# Patient Record
Sex: MALE | URBAN - METROPOLITAN AREA
[De-identification: names, ages, dates, MRNs, and addresses within clinical notes are randomized per-mention and may not be internally consistent; named-entity substitution may affect disease eponyms.]

---

## 2019-09-13 NOTE — PATIENT DISCUSSION
POAG, OU: INTRAOCULAR PRESSURE IS WITHIN ACCEPTABLE LIMITS. PT INSTRUCTED TO CONTINUE ALPHAGAN BID OU AND LUMIGAN QHS OU. REFER TO DR. WONG FOR GLAUCOMA EVALUATION AND MANAGEMENT.

## 2019-09-13 NOTE — PATIENT DISCUSSION
Stopped Today: PreserVision AREDS 2 (vit c,w-om-vczux-lutein-zeaxan): capsule: [id number] mg-unit-mg-mg 1 capsule twice a day as directed by mouth

## 2019-09-13 NOTE — PATIENT DISCUSSION
RETINA IS ATTACHED OU: PVD OU; NO DEFINITE SIGNS OF AGE-RELATED MACULAR DEGENERATION; NO HOLES OR TEARS SEEN ON DILATED EXAM TODAY.  RETINAL DETACHMENT SIGNS AND SYMPTOMS REVIEWED

## 2019-12-12 NOTE — PATIENT DISCUSSION
POAG, OU: INTRAOCULAR PRESSURE IS WITHIN ACCEPTABLE LIMITS. PT INSTRUCTED TO CONTINUE ALPHAGAN BID OU AND LUMIGAN QHS OU AND RETURN FOR FOLLOW-UP AS SCHEDULED.

## 2020-02-17 NOTE — PATIENT DISCUSSION
POAG OU:  VISUAL FIELD SHOWS SUPERIOR NASAL STEP DEFECTS SHOWING PROGRESSION OF VISION LOSS OD AND SUPERIOR AND INFERIOR NASAL STEP DEFECTS OS SHOWING VISION LOSS, WILL DISCUSS SLT OR DROP CHANGES OR SURGERY.

## 2020-05-13 ENCOUNTER — IMPORTED ENCOUNTER (OUTPATIENT)
Dept: URBAN - METROPOLITAN AREA CLINIC 1 | Facility: CLINIC | Age: 30
End: 2020-05-13

## 2020-05-13 PROBLEM — T15.11XA: Noted: 2020-05-13

## 2020-05-13 PROCEDURE — 65205 REMOVE FOREIGN BODY FROM EYE: CPT

## 2020-05-13 NOTE — PATIENT DISCUSSION
1.  Conjunctival FB Removal OD -- Risks Benefits and Alternatives were discussed with patient. Patient wishes to proceed with removal of foreign body and a general consent was signed. A Foreign Body was removed from the cornea with forceps under topical anesthesia. There were no complications. Post op instructions were discussed/given to patient and patient was advised to call our office if any problems arise. Begin Tobradex qid OD (erx'd). Use PF ATs q1hr OD. Return for an appointment in 1 week 10 with Dr. Dionne Mathew.

## 2020-05-18 NOTE — PATIENT DISCUSSION
POAG OU:  LAST VISUAL FIELD SHOWED PROGRESSION OS&gt;OD AND IT WAS THE FIRST VISUAL FIELD IN A LONG TIME PER PATIENT. CURRENTLY ON ALPHAGAN OU BID AND LUMIGAN OU QHS. WE DO NOT HAVE PRIOR RECORDS. WE COULD CHANGE THE DROPS OR ADD ANOTHER DROP OR SLT. DISCUSSED RBA'S OF EACH. PATIENT WISHES TO PROCEED WITH SLT. WILL DO SLT OD THEN OS. WILL ALSO GET OLD RECORDS FROM DR. FLAVIA Shea.

## 2020-06-12 NOTE — PATIENT DISCUSSION
New Prescription: Pred Forte (prednisolone acetate): drops,suspension: 1% 1 drop four times a day as directed into affected eye 06-

## 2020-06-26 NOTE — PATIENT DISCUSSION
POAG, OU- S/P SLT OD, SCHEDULE SLT OS. INTRAOCULAR PRESSURE IS WITHIN ACCEPTABLE LIMITS. PATIENT INSTRUCTED TO RETURN FOR FOLLOW-UP AS SCHEDULED.

## 2020-10-13 NOTE — PATIENT DISCUSSION
POAG OU: S/P SLT OU - IOP WNL. CONTINUE WITH ALPHAGAN BID OU AND LUMIGAN HS OU. FOLLOW UP AS SCHEDULED.

## 2021-02-15 NOTE — PATIENT DISCUSSION
POAG OU:  VISUAL FIELD SHOWS INCREASE PROGRESSION OF VISION LOSS SHOWING SUPERIOR ARCUATE CHANGES OD AND SUPERIOR NASAL STEP DEFECTS OS, WILL DISCUSS DROPS VS SLT VS SX.

## 2021-04-08 NOTE — PATIENT DISCUSSION
POAG OU: LAST VISUAL FIELD SHOWED INCREASE PROGRESSION OF VISION LOSS SHOWING SUPERIOR ARCUATE CHANGES OD AND SUPERIOR NASAL STEP DEFECTS OS. DISCUSSED OPTIONS OF ADDING A DROP VS SLT. CURRENTLY ON APHAGAN OU BID AND LUMIGAN OU QHS.   WILL INCREASE ALPHAGAN TO TID OU INSTEAD OF BID

## 2021-08-02 NOTE — PATIENT DISCUSSION
POAG, OU: INTRAOCULAR PRESSURE IS WITHIN ACCEPTABLE LIMITS. PT INSTRUCTED TO CONTINUE ALPHAGAN OU TID AND LUMIGAN OU QHS AND RETURN FOR FOLLOW-UP AS SCHEDULED.

## 2022-04-08 ASSESSMENT — VISUAL ACUITY
OD_CC: 20/20
OS_CC: 20/20

## 2022-06-23 NOTE — PATIENT DISCUSSION
DISCUSSED NEW VF CHANGE OD AND SO WILL SCHEDULE SLT OD THEN OS AND WILL CONTINUE ALPHAGAN OU BID AND LUMIGAN OU QHS.

## 2022-07-08 NOTE — PROCEDURE NOTE: CLINICAL
PROCEDURE NOTE: SLT OD. Diagnosis: POAG, Severe. Anesthesia: Topical. Prep: Betadine Flush. Prior to treatment, the risks/benefits/alternatives were discussed. The patient wished to proceed with procedure. Lens:  SLT laser lens with goniosol. Power: 1mJ. Total applications: 305. Application 537 Degrees. Patient tolerated procedure well. There were no complications. Post-op instructions given. Trinh Anand

## 2022-08-12 NOTE — PROCEDURE NOTE: CLINICAL
PROCEDURE NOTE: SLT OS. Diagnosis: POAG, Severe. Anesthesia: Topical. Prep: Betadine Flush. Prior to treatment, the risks/benefits/alternatives were discussed. The patient wished to proceed with procedure. Lens:  SLT laser lens with goniosol. Power: 1mJ. Total applications: 124. Application 899 Degrees. Patient tolerated procedure well. There were no complications. Post-op instructions given. Karen Long